# Patient Record
Sex: FEMALE | Race: WHITE | NOT HISPANIC OR LATINO | Employment: FULL TIME | URBAN - METROPOLITAN AREA
[De-identification: names, ages, dates, MRNs, and addresses within clinical notes are randomized per-mention and may not be internally consistent; named-entity substitution may affect disease eponyms.]

---

## 2017-03-07 DIAGNOSIS — H91.92 HEARING LOSS, LEFT: ICD-10-CM

## 2017-03-07 RX ORDER — SPIRONOLACTONE 50 MG/1
50 TABLET, FILM COATED ORAL DAILY
Qty: 30 TABLET | Refills: 3 | Status: SHIPPED | OUTPATIENT
Start: 2017-03-07 | End: 2017-07-05

## 2017-05-17 ENCOUNTER — HOSPITAL ENCOUNTER (OUTPATIENT)
Dept: MAMMOGRAPHY | Facility: CLINIC | Age: 57
Discharge: HOME | End: 2017-05-17
Attending: FAMILY MEDICINE | Admitting: FAMILY MEDICINE
Payer: COMMERCIAL

## 2017-05-17 DIAGNOSIS — Z12.31 VISIT FOR SCREENING MAMMOGRAM: ICD-10-CM

## 2017-05-17 PROCEDURE — G0202 SCR MAMMO BI INCL CAD: HCPCS

## 2017-06-11 DIAGNOSIS — H91.90 HEARING LOSS: Primary | ICD-10-CM

## 2017-06-14 ENCOUNTER — OFFICE VISIT (OUTPATIENT)
Dept: AUDIOLOGY | Facility: CLINIC | Age: 57
End: 2017-06-14

## 2017-06-14 ENCOUNTER — OFFICE VISIT (OUTPATIENT)
Dept: OTOLARYNGOLOGY | Facility: CLINIC | Age: 57
End: 2017-06-14

## 2017-06-14 VITALS — WEIGHT: 131 LBS | BODY MASS INDEX: 23.21 KG/M2 | HEIGHT: 63 IN

## 2017-06-14 DIAGNOSIS — H81.02 MENIERE DISEASE, LEFT: Primary | ICD-10-CM

## 2017-06-14 DIAGNOSIS — H90.5 SENSORINEURAL HEARING LOSS OF LEFT EAR: Primary | ICD-10-CM

## 2017-06-14 DIAGNOSIS — H93.8X2 FULLNESS IN EAR, LEFT: ICD-10-CM

## 2017-06-14 DIAGNOSIS — H91.90 HEARING LOSS: ICD-10-CM

## 2017-06-14 DIAGNOSIS — H93.13 TINNITUS, BILATERAL: ICD-10-CM

## 2017-06-14 RX ORDER — FOLIC ACID 1 MG/1
TABLET ORAL
COMMUNITY
Start: 2016-10-27

## 2017-06-14 RX ORDER — PREDNISONE 5 MG/1
TABLET ORAL
COMMUNITY
Start: 2016-10-27

## 2017-06-14 RX ORDER — IBUPROFEN 200 MG
1 CAPSULE ORAL DAILY
COMMUNITY

## 2017-06-14 ASSESSMENT — PAIN SCALES - GENERAL: PAINLEVEL: NO PAIN (0)

## 2017-06-14 NOTE — NURSING NOTE
Chief Complaint   Patient presents with     RECHECK     follow up     Consult     Syed Acuna LPN

## 2017-06-14 NOTE — PATIENT INSTRUCTIONS
You can see the audiologist for a bicross aid.   Please call our clinic for any questions,concerns,or worsening symptoms.      Clinic #[phone]       Option 3  for the triage nurse.

## 2017-06-14 NOTE — MR AVS SNAPSHOT
After Visit Summary   2017    Leyda Scruggs    MRN: [id number]           Patient Information     Date Of Birth          1960        Visit Information        Provider Department      2017 8:30 AM Vinita Jones AuD [facility] Audiology        Today's Diagnoses     Sensorineural hearing loss of left ear    -  1    Tinnitus, bilateral        Fullness in ear, left           Follow-ups after your visit        Who to contact     Please call your clinic at [phone] to:    Ask questions about your health    Make or cancel appointments    Discuss your medicines    Learn about your test results    Speak to your doctor   If you have compliments or concerns about an experience at your clinic, or if you wish to file a complaint, please contact [facility] Patient Relations at [phone] or email us at [email or web address]         Additional Information About Your Visit        MyChart Information     Xsigo is an electronic gateway that provides easy, online access to your medical records. With Xsigo, you can request a clinic appointment, read your test results, renew a prescription or communicate with your care team.     To sign up for Xsigo visit the website at [email or web address]   You will be asked to enter the access code listed below, as well as some personal information. Please follow the directions to create your username and password.     Your access code is: [email or web address]  Expires: 2017  6:31 AM     Your access code will  in 90 days. If you need help or a new code, please contact your [facility] or call [phone] for assistance.        Care EveryWhere ID     This is your Care EveryWhere ID. This could be used by other organizations to access your [place] medical records  [id number]         Blood Pressure from Last 3 Encounters:   No data found for BP    Weight from Last 3 Encounters:    06/14/17 59.4 kg (131 lb)              We Performed the Following     AUDIOGRAM/TYMPANOGRAM - INTERFACE     [facility] Audiometry Thrshld Eval & Speech Recog (72316)     Tymps / Reflex   (35447)        Primary Care Provider Office Phone # Fax #    Vinnie Hernandez [phone]       [facility] [address] [place] [address]        Thank you!     Thank you for choosing [facility] AUDIOLOGY  for your care. Our goal is always to provide you with excellent care. Hearing back from our patients is one way we can continue to improve our services. Please take a few minutes to complete the written survey that you may receive in the mail after your visit with us. Thank you!             Your Updated Medication List - Protect others around you: Learn how to safely use, store and throw away your medicines at www.disposemymeds.org.          This list is accurate as of: 6/14/17  9:54 AM.  Always use your most recent med list.                   Brand Name Dispense Instructions for use    calcium citrate 950 MG tablet    CALCITRATE     Take 1 tablet by mouth daily       folic acid 1 MG tablet    FOLVITE     TAKE 1 TABLET BY ORAL ROUTE EVERY MORNING       methotrexate 2.5 MG tablet CHEMO      2.5 mg       predniSONE 5 MG tablet    DELTASONE     Current dose 7.5 mg once daily       spironolactone 50 MG tablet    ALDACTONE    30 tablet    Take 1 tablet (50 mg) by mouth daily

## 2017-06-14 NOTE — PROGRESS NOTES
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  In the last year, roughly she has had continued symptoms of rheumatoid arthritis and had to be placed on methotrexate and prednisone.  The patient reports that her dizziness has not been a problem, but her hearing in her left ear continues to deteriorate.  She notes a decline in her word recognition score.  The patient otherwise denies any other changes in her medical condition.        REVIEW OF SYSTEMS/MEDICATIONS:  I did go through her review of systems her medications, she is still on spironolactone.      PHYSICAL EXAMINATION:  Examination today shows her tympanic membranes are intact.  Her tandem gait she had two side steps out of 10 steps.  She was able to do Romberg and finger-to-nose.  She has no nystagmus.  Her facial nerve is a House-Brackmann I.      AUDIOGRAM:  Her audio shows that her word recognition score, which was 50%, has now dropped off to 0%.  The patient has no other new changes.  She brought up the issue of tinnitus in her right ear.  Her audiogram today is about the same.      IMPRESSION:  My overall impression is that she has stable left Meniere's disease which is just gradually continuing to decline in word understanding.      PLAN:  We talked about CROS aids and BAHA.  I have gone over this issue.  I think she is a good candidate for CROS aid.  She tried that before doing anything further.      I talked with her about the fact that the left ear may ultimately require cochlear implant.  The right ear at least for now does not show signs of disease.  However, the appearance of intermittent tinnitus is worrisome, although not diagnostic yet.  I have gone over the issues.  I think that she can follow up in one year.  I have cleared her for a CROS aid.      SL/km

## 2017-06-14 NOTE — MR AVS SNAPSHOT
After Visit Summary   2017    Leyda Scruggs    MRN: [id number]           Patient Information     Date Of Birth          1960        Visit Information        Provider Department      2017 9:45 AM Luis Sylvester MD [facility] Ear Nose and Throat        Care Instructions    You can see the audiologist for a bicross aid.   Please call our clinic for any questions,concerns,or worsening symptoms.      Clinic #[phone]       Option 3  for the triage nurse.          Follow-ups after your visit        Your next 10 appointments already scheduled     2017  8:30 AM CDT   Hearing Aid Consult with Marcell Salas [facility] Audiology ([facility])    [address] [place] [address]  4th [facility] [address] [phone]              Who to contact     Please call your clinic at [phone] to:    Ask questions about your health    Make or cancel appointments    Discuss your medicines    Learn about your test results    Speak to your doctor   If you have compliments or concerns about an experience at your clinic, or if you wish to file a complaint, please contact [facility] Patient Relations at [phone] or email us at [email or web address]         Additional Information About Your Visit        MyChart Information     MeMeMet is an electronic gateway that provides easy, online access to your medical records. With Yuanpei Translation, you can request a clinic appointment, read your test results, renew a prescription or communicate with your care team.     To sign up for MeMeMet visit the website at [email or web address]   You will be asked to enter the access code listed below, as well as some personal information. Please follow the directions to create your username and password.     Your access code is: [email or web address]  Expires: 2017  6:31 AM     Your access code will  in 90 days. If you need help or a new  "code, please contact your [facility] Clinic or call [phone] for assistance.        Care EveryWhere ID     This is your Care EveryWhere ID. This could be used by other organizations to access your [place] medical records  [id number]        Your Vitals Were     Height BMI (Body Mass Index)                1.61 m (5' 3.39\") 22.92 kg/m2           Blood Pressure from Last 3 Encounters:   No data found for BP    Weight from Last 3 Encounters:   06/14/17 59.4 kg (131 lb)              Today, you had the following     No orders found for display       Primary Care Provider Office Phone # Fax #    Vinnie Hernandez [phone]       [facility] [address] [place] [address]        Thank you!     Thank you for choosing [facility] EAR NOSE AND THROAT  for your care. Our goal is always to provide you with excellent care. Hearing back from our patients is one way we can continue to improve our services. Please take a few minutes to complete the written survey that you may receive in the mail after your visit with us. Thank you!             Your Updated Medication List - Protect others around you: Learn how to safely use, store and throw away your medicines at www.disposemymeds.org.          This list is accurate as of: 6/14/17 10:14 AM.  Always use your most recent med list.                   Brand Name Dispense Instructions for use    calcium citrate 950 MG tablet    CALCITRATE     Take 1 tablet by mouth daily       folic acid 1 MG tablet    FOLVITE     TAKE 1 TABLET BY ORAL ROUTE EVERY MORNING       methotrexate 2.5 MG tablet CHEMO      2.5 mg       predniSONE 5 MG tablet    DELTASONE     Current dose 7.5 mg once daily       spironolactone 50 MG tablet    ALDACTONE    30 tablet    Take 1 tablet (50 mg) by mouth daily         "

## 2017-06-14 NOTE — PROGRESS NOTES
AUDIOLOGY REPORT    SUMMARY: Audiology visit completed. See audiogram for results.      RECOMMENDATIONS: Follow-up with ENT.      Marcell Salas  Audiologist  [place] License  #[id number]

## 2017-06-14 NOTE — LETTER
Hearing Aid Medical Clearance    Leyda Scruggs  June 14, 2017        This patient has received a medical examination and may be considered a suitable candidate for a hearing aid.   Bicross aid.      Physician:__________________________________________________

## 2017-07-05 DIAGNOSIS — H81.09 MENIERE'S DISEASE: Primary | ICD-10-CM

## 2017-07-05 RX ORDER — SPIRONOLACTONE 50 MG/1
50 TABLET, FILM COATED ORAL DAILY
Qty: 30 TABLET | Refills: 11 | Status: SHIPPED | OUTPATIENT
Start: 2017-07-05 | End: 2018-06-22

## 2017-07-24 ENCOUNTER — OFFICE VISIT (OUTPATIENT)
Dept: AUDIOLOGY | Facility: CLINIC | Age: 57
End: 2017-07-24

## 2017-07-24 DIAGNOSIS — H90.42 SENSORINEURAL HEARING LOSS (SNHL) OF LEFT EAR WITH UNRESTRICTED HEARING OF RIGHT EAR: Primary | ICD-10-CM

## 2017-07-24 NOTE — PROGRESS NOTES
AUDIOLOGY REPORT    SUBJECTIVE: Leyda Scruggs is a 56 year old female was seen in the Audiology Clinic at  [facility] on 7/24/17 to discuss concerns with hearing and functional communication difficulties.  Leyda has been seen previously on 6/14/2017, and results revealed a left sensorineural hearing loss with poor word recognition score and normal hearing in the right ear.  The patient was medically evaluated and determined to be cleared for a CROS device by Dr. Shon Sylvester. Leyda notes difficulty with communication in a variety of listening situations, mostly when someone is seated on her left side.  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    OBJECTIVE:  Patient is a hearing aid candidate. The patient was presented with different options for amplification to help aid in communication (CROS vs traditional). Discussed styles, levels of technology and monaural vs. binaural fitting.     Patient was able to tral a demo Widex CROS device in the clinic today and realistic expectations were reviewed. She decided that she would like to move forward and pursue CROS device.    The hearing aids mutually chosen were:  Right: Phonak Audeo B50  Left: Phonak CROS B transmitter  COLOR: P3  BATTERY SIZE: 13  EARMOLD/TIPS: Open domes  CANAL/ LENGTH: 1 standard      ASSESSMENT:     Reviewed purchase information and warranty information with patient. The 45 day trial period was explained to patient. The patient was given a copy of the [place] Department of Health consumer brochure on purchasing hearing instruments. Patient risk factors have been provided to the patient in writing prior to the sale of the hearing aid per FDA regulation. The risk factors are also available in the User Instructional Booklet to be presented on the day of the hearing aid fitting. Hearing aid(s) ordered. Hearing aid evaluation completed.    PLAN: Leyda is scheduled to  return in 2-3 weeks for a hearing aid fitting and programming. Purchase agreement will be completed on that date. Please contact this clinic with any questions or concerns.      Marcell Salas  Audiologist  [place] License  #[id number]

## 2017-07-24 NOTE — MR AVS SNAPSHOT
After Visit Summary   2017    Leyda Scruggs    MRN: [id number]           Patient Information     Date Of Birth          1960        Visit Information        Provider Department      2017 8:30 AM Vinita Jones AuD [facility] Audiology         Follow-ups after your visit        Your next 10 appointments already scheduled     Aug 21, 2017 10:00 AM CDT   Hearing Aid Fitting with Marcell Salas [facility] Audiology ([facility])    [address] [place] [address] [phone]            Sep 07, 2017  8:30 AM CDT   (Arrive by 8:15 AM)   Initial Review Program with Marcell Salas [facility] Audiology ([facility])    [address] [place] [address] [phone]              Who to contact     Please call your clinic at [phone] to:    Ask questions about your health    Make or cancel appointments    Discuss your medicines    Learn about your test results    Speak to your doctor   If you have compliments or concerns about an experience at your clinic, or if you wish to file a complaint, please contact [facility] Patient Relations at [phone] or email us at [email or web address]         Additional Information About Your Visit        MyChart Information     Auterrat is an electronic gateway that provides easy, online access to your medical records. With Vamosa, you can request a clinic appointment, read your test results, renew a prescription or communicate with your care team.     To sign up for Auterrat visit the website at [email or web address]   You will be asked to enter the access code listed below, as well as some personal information. Please follow the directions to create your username and password.     Your access code is: [email or web address]  Expires: 2017  6:31 AM     Your access code will  in 90  days. If you need help or a new code, please contact your [facility] Clinic or call [phone] for assistance.        Care EveryWhere ID     This is your Care EveryWhere ID. This could be used by other organizations to access your [place] medical records  [id number]         Blood Pressure from Last 3 Encounters:   No data found for BP    Weight from Last 3 Encounters:   06/14/17 59.4 kg (131 lb)              Today, you had the following     No orders found for display       Primary Care Provider Office Phone # Fax #    Vinnie Hernandez [phone]       [facility] [address]  [place] [address]        Equal Access to Services     [facility]: Hadii jerzy bone hadgaetanoo Soamauri, waaxda luqadaha, qaybta kaalmada aderitayada, carlos blackmon . So [facility] [phone].    ATENCIÓN: Si habla español, tiene a tom disposición servicios gratuitos de asistencia lingüística. Llame al [phone].    We comply with applicable federal civil rights laws and [place] laws. We do not discriminate on the basis of race, color, national origin, age, disability sex, sexual orientation or gender identity.            Thank you!     Thank you for choosing [facility] AUDIOLOGY  for your care. Our goal is always to provide you with excellent care. Hearing back from our patients is one way we can continue to improve our services. Please take a few minutes to complete the written survey that you may receive in the mail after your visit with us. Thank you!             Your Updated Medication List - Protect others around you: Learn how to safely use, store and throw away your medicines at www.disposemymeds.org.          This list is accurate as of: 7/24/17  9:35 AM.  Always use your most recent med list.                   Brand Name Dispense Instructions for use Diagnosis    calcium citrate 950 MG tablet    CALCITRATE     Take 1 tablet by mouth daily        folic acid 1 MG  tablet    FOLVITE     TAKE 1 TABLET BY ORAL ROUTE EVERY MORNING        methotrexate 2.5 MG tablet CHEMO      2.5 mg        predniSONE 5 MG tablet    DELTASONE     Current dose 7.5 mg once daily        spironolactone 50 MG tablet    ALDACTONE    30 tablet    Take 1 tablet (50 mg) by mouth daily    Meniere's disease

## 2017-08-21 ENCOUNTER — OFFICE VISIT (OUTPATIENT)
Dept: AUDIOLOGY | Facility: CLINIC | Age: 57
End: 2017-08-21

## 2017-08-21 DIAGNOSIS — H90.42 SENSORINEURAL HEARING LOSS (SNHL) OF LEFT EAR WITH UNRESTRICTED HEARING OF RIGHT EAR: Primary | ICD-10-CM

## 2017-08-21 NOTE — MR AVS SNAPSHOT
After Visit Summary   2017    Leyda Scruggs    MRN: [id number]           Patient Information     Date Of Birth          1960        Visit Information        Provider Department      2017 10:05 AM Vinita Jones AuD [facility] Audiology        Today's Diagnoses     Sensorineural hearing loss (SNHL) of left ear with unrestricted hearing of right ear    -  1       Follow-ups after your visit        Your next 10 appointments already scheduled     Sep 07, 2017  8:30 AM CDT   (Arrive by 8:15 AM)   Initial Review Program with Marcell Salas [facility] Audiology ([facility] [place])    [address] [place] [address] [phone]              Who to contact     Please call your clinic at [phone] to:    Ask questions about your health    Make or cancel appointments    Discuss your medicines    Learn about your test results    Speak to your doctor   If you have compliments or concerns about an experience at your clinic, or if you wish to file a complaint, please contact [facility] Patient Relations at [phone] or email us at [email or web address]         Additional Information About Your Visit        MyChart Information     Gruppo MutuiOnlinehart is an electronic gateway that provides easy, online access to your medical records. With Zumeo.com, you can request a clinic appointment, read your test results, renew a prescription or communicate with your care team.     To sign up for eFuneralt visit the website at [email or web address][email or web address]   You will be asked to enter the access code listed below, as well as some personal information. Please follow the directions to create your username and password.     Your access code is: [email or web address]  Expires: 2017  6:31 AM     Your access code will  in 90 days. If you need help or a new code, please contact your [facility] or  call [phone] for assistance.        Care EveryWhere ID     This is your Care EveryWhere ID. This could be used by other organizations to access your [place] medical records  [id number]         Blood Pressure from Last 3 Encounters:   No data found for BP    Weight from Last 3 Encounters:   06/14/17 59.4 kg (131 lb)              We Performed the Following     Hearing Aid Fitting        Primary Care Provider Office Phone # Fax #    Vinnie Hernandez [phone]       [facility] [address] [place] [address]        Equal Access to Services     [facility]: Hadii aad ku hadasho Soomaali, waaxda luqadaha, qaybta kaalmada adeegyada, carlos blackmon . So [facility] [phone].    ATENCIÓN: Si habla español, tiene a tom disposición servicios gratuitos de asistencia lingüística. [facility] [phone].    We comply with applicable federal civil rights laws and [place] laws. We do not discriminate on the basis of race, color, national origin, age, disability sex, sexual orientation or gender identity.            Thank you!     Thank you for choosing [facility] AUDIOLOGY  for your care. Our goal is always to provide you with excellent care. Hearing back from our patients is one way we can continue to improve our services. Please take a few minutes to complete the written survey that you may receive in the mail after your visit with us. Thank you!             Your Updated Medication List - Protect others around you: Learn how to safely use, store and throw away your medicines at www.disposemymeds.org.          This list is accurate as of: 8/21/17  7:23 PM.  Always use your most recent med list.                   Brand Name Dispense Instructions for use Diagnosis    calcium citrate 950 MG tablet    CALCITRATE     Take 1 tablet by mouth daily        folic acid 1 MG tablet    FOLVITE     TAKE 1 TABLET BY ORAL ROUTE EVERY MORNING        methotrexate 2.5 MG tablet CHEMO      2.5  mg        predniSONE 5 MG tablet    DELTASONE     Current dose 7.5 mg once daily        spironolactone 50 MG tablet    ALDACTONE    30 tablet    Take 1 tablet (50 mg) by mouth daily    Meniere's disease

## 2017-08-21 NOTE — PROGRESS NOTES
"AUDIOLOGY REPORT    SUBJECTIVE: Leyda Scruggs is a 56 year old female who was seen in the Audiology Clinic at the [facility] for a fitting of a right Phonak Audeo B50 hearing aid and left Phonak CROS B transmitter. Previous results have revealed a unilateral sensorineural hearing loss with poor word recognition in the left ear. The patient was given medical clearance to pursue amplification by Luis Sylvester MD.     OBJECTIVE: The hearing aid conformity evaluation was completed.The hearing aids were placed and they provided a good fit. Real-ear-probe-microphone measurements were completed on the Poppin system and were a good match to NAL-NL1 target with soft sounds audible, moderate sounds comfortable, and loud sounds below discomfort. UCLs are verified through maximum power output measures and demonstrate appropriate limiting of loud inputs. Leyda was oriented to proper hearing aid use, care, cleaning (no water, dry brush), batteries (size 13, insertion/removal, toxicity, low-battery signal), aid insertion/removal, user booklet, warranty information, storage cases, and other hearing aid details. The patient confirmed understanding of hearing aid use and care, and showed proper insertion of hearing aid and batteries while in the office today.Leyda reported good volume and sound quality today. Of note, she did report a \"static\"/ \"crackling\" sound on in the right ear, it was discussed that because of her good hearing in the right ear she is able to hear the circuit noise coming from the hearing aid.   Hearing aids were programmed as follows:  Program 1:AutoSense  Program button activated for Left Microphone Attenuation  Volume control activated (Flex control right an CROS balance left).    EAR(S) FIT: Binaural  HEARING AID MODEL NAME:  Right: Phonak Audeo B50,  Left: Phonak CROS B Transmitter  EARMOLDS/TIP/ LINK: #2 bilaterally, open medium left, open large right  SERIAL NUMBERS: " Right: [id number]; Left: [id number]  WARRANTY END DATE: 10/21/19 Right,  10/21/2018 Left    ASSESSMENT: A right hearing aid and left CROS transmitter were fit today. Verification measures were performed. Leyda signed the Hearing Aid Purchase Agreement and was given a copy, as well as details on her hearing aids.    PLAN:Leyda will return for follow-up in 2-3 weeks for a hearing aid review appointment. Please call this clinic with questions regarding today s appointment.    Marcell Salas  Audiologist  [place] License  #[id number]

## 2017-08-23 ENCOUNTER — TELEPHONE (OUTPATIENT)
Dept: AUDIOLOGY | Facility: CLINIC | Age: 57
End: 2017-08-23

## 2017-08-23 NOTE — TELEPHONE ENCOUNTER
Email received from patient:      From: Leyda Scruggs [[email or web address]   Sent: Tuesday, August 22, 2017 5:43 PM  To: Vinita Watts  Subject: questions about my Phonak CROS B hearing aid I received on Monday    Vinita,    I have been wearing my CROS B hearing aid the last couple of days.  I noticed that I don t seem to be able to comprehend words in my left ear (the one in which I lost hearing) when I use the telephone and when I wear a head set to listen to a meeting remotely.  I have figured out how to mute the transmitter, which is nice while I m high way driving or in the [place], so I know that the hearing aid wasn t muted when I was on the phone.      Did I misunderstand and the hearing aid should just improve general sounds or should I be able to hear and understand words with my hearing aids in place?  Am I expecting too much to be able to use a telephone and hear using my left ear?      Regards,  Leyda Scruggs      Response:     God clinton Crabtree,    The way this system works is that it is only putting sound into the right ear, we are completely bypassing the left ear.  The transmitter does not put any sound into the left ear, it s only function is to transmit sounds from the left side to the right side.    Because we are not amplifying sound in the left ear, I would expect your left ear to function exactly the same as it did prior to wearing the CROS device.  Again, the purpose of the device is to give you access to sounds on your left side by allowing you to hear the left side sounds in your right ear.  I would recommend to continuing to use the right ear with the phone.    Does that help clarify things?  Let me know if you have additional questions,    Regards    Amadeo Salas  Audiologist

## 2017-09-07 ENCOUNTER — OFFICE VISIT (OUTPATIENT)
Dept: AUDIOLOGY | Facility: CLINIC | Age: 57
End: 2017-09-07

## 2017-09-07 DIAGNOSIS — H90.42 SENSORINEURAL HEARING LOSS (SNHL) OF LEFT EAR WITH UNRESTRICTED HEARING OF RIGHT EAR: Primary | ICD-10-CM

## 2017-09-07 NOTE — PROGRESS NOTES
"AUDIOLOGY REPORT    BACKGROUND INFORMATION: Leyda Scruggs was seen in the Audiology Clinic at the [facility] [place] on 9/7/2017 for follow-up.  The patient has been seen previously in this clinic on 6/14/17 and results revealed normal hearing in the right ear and a mild sloping to severe sensorineural hearing loss in the left ear with poor word recognition scores in the left ear.  The patient was fit with a right Phonak Audeo B50 hearing aid and left CROS B transmitter on 8/21/17.  The patient reports discomfort from he retention pieces and that sounds on the left ear are garbled.  She also struggles when using the phone on the transmitter side.  She is unsure if she will be keeping the devices.    TEST RESULTS AND PROCEDURES: A first follow-up was performed. Otoscopy revealed clear ear canals with no sores or irritations noted. The retention tails were modified and made shorter as she felt that they were too long.  Adjustments were made including increasing the \"CROS balance\" to be stronger on the CROS side as well as turning down the high frequency gain 3 dB. The proper positioning of the telephone was practiced, Leyda had been holding the phone too low on the ear instead of up by the microphones.  She reported that she was excited to try and use the phone on the CROS side at work.     Patient was counseled that she would still be hearing sounds in the left ear from her environment because she still has hearing thresholds and the CROS side does not completely eliminate her ability to hear in the left ear.  She expressed understanding.    SUMMARY AND RECOMMENDATIONS: A first follow-up was performed today and the patient reports that she is unsure if she would like to keep the devices.  Adjustments were made as noted above and the patient will return for follow-up on 10/5/17.  Call this clinic with questions regarding today s visit.      Vinita Jones, " AuD  Audiologist  [place] License  #[id number]

## 2017-09-07 NOTE — MR AVS SNAPSHOT
After Visit Summary   2017    Leyda Scruggs    MRN: [id number]           Patient Information     Date Of Birth          1960        Visit Information        Provider Department      2017 8:30 AM Vinita Jones AuD M [facility] Audiology        Today's Diagnoses     Sensorineural hearing loss (SNHL) of left ear with unrestricted hearing of right ear    -  1       Follow-ups after your visit        Who to contact     Please call your clinic at [phone] to:    Ask questions about your health    Make or cancel appointments    Discuss your medicines    Learn about your test results    Speak to your doctor   If you have compliments or concerns about an experience at your clinic, or if you wish to file a complaint, please contact [facility] Patient Relations at [phone] or email us at [email or web address]         Additional Information About Your Visit        MyChart Information     Widespacet is an electronic gateway that provides easy, online access to your medical records. With [employer], you can request a clinic appointment, read your test results, renew a prescription or communicate with your care team.     To sign up for Widespacet visit the website at [email or web address]   You will be asked to enter the access code listed below, as well as some personal information. Please follow the directions to create your username and password.     Your access code is: [email or web address]  Expires: 2017 11:53 AM     Your access code will  in 90 days. If you need help or a new code, please contact your [facility] Clinic or call [phone] for assistance.        Care EveryWhere ID     This is your Care EveryWhere ID. This could be used by other organizations to access your [place] medical records  [id number]         Blood Pressure from Last 3 Encounters:   No data found for BP    Weight from Last 3 Encounters:   17  59.4 kg (131 lb)              We Performed the Following     No Charge, Hearing Aid Clinic Visit        Primary Care Provider Office Phone # Fax #    Vinnie Hernandez [phone]       [facility] [address] [place] [address]        Equal Access to Services     [facility]: Hadii aad ku hadasho Soomaali, waaxda luqadaha, qaybta kaalmada adeegyada, waxay idiin hayaan aderita ortiz laMaheshfariban . So [facility] [phone].    ATENCIÓN: Si habla español, tiene a tom disposición servicios gratuitos de asistencia lingüística. Betzaidaame al [phone].    We comply with applicable federal civil rights laws and [place] laws. We do not discriminate on the basis of race, color, national origin, age, disability sex, sexual orientation or gender identity.            Thank you!     Thank you for choosing [facility] AUDIOLOGY  for your care. Our goal is always to provide you with excellent care. Hearing back from our patients is one way we can continue to improve our services. Please take a few minutes to complete the written survey that you may receive in the mail after your visit with us. Thank you!             Your Updated Medication List - Protect others around you: Learn how to safely use, store and throw away your medicines at www.disposemymeds.org.          This list is accurate as of: 9/7/17 11:53 AM.  Always use your most recent med list.                   Brand Name Dispense Instructions for use Diagnosis    calcium citrate 950 MG tablet    CALCITRATE     Take 1 tablet by mouth daily        folic acid 1 MG tablet    FOLVITE     TAKE 1 TABLET BY ORAL ROUTE EVERY MORNING        methotrexate 2.5 MG tablet CHEMO      2.5 mg        predniSONE 5 MG tablet    DELTASONE     Current dose 7.5 mg once daily        spironolactone 50 MG tablet    ALDACTONE    30 tablet    Take 1 tablet (50 mg) by mouth daily    Meniere's disease

## 2017-10-05 ENCOUNTER — OFFICE VISIT (OUTPATIENT)
Dept: AUDIOLOGY | Facility: CLINIC | Age: 57
End: 2017-10-05

## 2017-10-05 DIAGNOSIS — H90.42 SENSORINEURAL HEARING LOSS (SNHL) OF LEFT EAR WITH UNRESTRICTED HEARING OF RIGHT EAR: Primary | ICD-10-CM

## 2017-10-05 NOTE — PROGRESS NOTES
AUDIOLOGY REPORT    BACKGROUND INFORMATION: Leyda Scruggs was seen in the Audiology Clinic at the [facility] on 10/5/2017 for follow-up.  The patient has been seen previously in this clinic on 6/14/2017 and results revealed normal hearing in the right ear and a mild sloping to severe sensorineural hearing loss in the left ear with poor word recognition scores in the left ear.  The patient was fit with a right Phonak Audeo B50 hearing aid and left CROS B transmitter on 8/21/2017.  The patient reports that the sound quality and perceived benefit has improved since the initial follow-up.  She reports that changing the CROS balance at the previous appointment has helped with sound awareness.  She reports that she is noticing benefit with the device in meetings and noted that one co-worker has mentioned that Leyda does not appear to startle when someone approaches from the left side.    TEST RESULTS AND PROCEDURES: A second follow-up was performed.  No adjustments were made as patient is happy with sound quality.  A review of the different features (CROS balance control on left device and volume on right device) was performed.  A review of cleaning, device care, and maintenance was also performed.  She was given spare domes to use to change out if needed.    She had questions regarding wear time of the devices.  It was reviewed that if she is in an environment where she feels she is not receiving benefit from having the CROS side activated, she may either use the microphone attenuation feature or remove the device.      SUMMARY AND RECOMMENDATIONS: A second follow-up was performed today and the patient reports that she has noticed more benefit since the first IRP and would like to keep the devices.  The patient will return as needed or at least every 4-6 months for cleaning and assessment of hearing aid.  Call this clinic with questions regarding today s visit.      Vinita  Marclel Jones  Audiologist  [place] License  #[id number]

## 2017-10-05 NOTE — MR AVS SNAPSHOT
After Visit Summary   10/5/2017    Leyda Scruggs    MRN: [id number]           Patient Information     Date Of Birth          1960        Visit Information        Provider Department      10/5/2017 10:00 AM Vinita Jones AuD M [facility] Audiology        Today's Diagnoses     Sensorineural hearing loss (SNHL) of left ear with unrestricted hearing of right ear    -  1       Follow-ups after your visit        Who to contact     Please call your clinic at [phone] to:    Ask questions about your health    Make or cancel appointments    Discuss your medicines    Learn about your test results    Speak to your doctor   If you have compliments or concerns about an experience at your clinic, or if you wish to file a complaint, please contact [facility] Patient Relations at [phone] or email us at [email or web address]         Additional Information About Your Visit        MyChart Information     J[employer]t is an electronic gateway that provides easy, online access to your medical records. With [employer], you can request a clinic appointment, read your test results, renew a prescription or communicate with your care team.     To sign up for Jibe Mobilet visit the website at [email or web address]   You will be asked to enter the access code listed below, as well as some personal information. Please follow the directions to create your username and password.     Your access code is: [email or web address]  Expires: 2017 11:53 AM     Your access code will  in 90 days. If you need help or a new code, please contact your [facility] or call [phone] for assistance.        Care EveryWhere ID     This is your Care EveryWhere ID. This could be used by other organizations to access your [place] medical records  [id number]         Blood Pressure from Last 3 Encounters:   No data found for BP    Weight from Last 3 Encounters:    06/14/17 59.4 kg (131 lb)              We Performed the Following     No Charge, Hearing Aid Clinic Visit        Primary Care Provider Office Phone # Fax #    Vinnie Hernandez [phone]       [facility] [address] [place] [address]        Equal Access to Services     [facility] : Hadii aad ku hadasho Soomaali, waaxda luqadaha, qaybta kaalmada adeegyada, waxay elizabethin hayfariban aderita ortiz lalissettn . So [facility] [phone].    ATENCIÓN: Si habla español, tiene a tom disposición servicios gratuitos de asistencia lingüística. [facility] [phone].    We comply with applicable federal civil rights laws and [place] laws. We do not discriminate on the basis of race, color, national origin, age, disability, sex, sexual orientation, or gender identity.            Thank you!     Thank you for choosing [facility] AUDIOLOGY  for your care. Our goal is always to provide you with excellent care. Hearing back from our patients is one way we can continue to improve our services. Please take a few minutes to complete the written survey that you may receive in the mail after your visit with us. Thank you!             Your Updated Medication List - Protect others around you: Learn how to safely use, store and throw away your medicines at www.disposemymeds.org.          This list is accurate as of: 10/5/17  1:12 PM.  Always use your most recent med list.                   Brand Name Dispense Instructions for use Diagnosis    calcium citrate 950 MG tablet    CALCITRATE     Take 1 tablet by mouth daily        folic acid 1 MG tablet    FOLVITE     TAKE 1 TABLET BY ORAL ROUTE EVERY MORNING        methotrexate 2.5 MG tablet CHEMO      2.5 mg        predniSONE 5 MG tablet    DELTASONE     Current dose 7.5 mg once daily        spironolactone 50 MG tablet    ALDACTONE    30 tablet    Take 1 tablet (50 mg) by mouth daily    Meniere's disease

## 2018-03-02 DIAGNOSIS — H90.5 SNHL (SENSORINEURAL HEARING LOSS): Primary | ICD-10-CM

## 2018-06-21 DIAGNOSIS — H81.09 MENIERE'S DISEASE: Primary | ICD-10-CM

## 2018-06-22 ENCOUNTER — OFFICE VISIT (OUTPATIENT)
Dept: AUDIOLOGY | Facility: CLINIC | Age: 58
End: 2018-06-22
Payer: COMMERCIAL

## 2018-06-22 ENCOUNTER — OFFICE VISIT (OUTPATIENT)
Dept: OTOLARYNGOLOGY | Facility: CLINIC | Age: 58
End: 2018-06-22
Payer: COMMERCIAL

## 2018-06-22 VITALS — BODY MASS INDEX: 23.21 KG/M2 | WEIGHT: 131 LBS | HEIGHT: 63 IN

## 2018-06-22 DIAGNOSIS — H92.03 OTALGIA OF BOTH EARS: ICD-10-CM

## 2018-06-22 DIAGNOSIS — H90.42 SENSORINEURAL HEARING LOSS (SNHL) OF LEFT EAR WITH UNRESTRICTED HEARING OF RIGHT EAR: Primary | ICD-10-CM

## 2018-06-22 DIAGNOSIS — H81.09 MENIERE'S DISEASE, UNSPECIFIED LATERALITY: ICD-10-CM

## 2018-06-22 DIAGNOSIS — H93.11 TINNITUS OF RIGHT EAR: ICD-10-CM

## 2018-06-22 DIAGNOSIS — H81.09 MENIERE'S DISEASE: ICD-10-CM

## 2018-06-22 DIAGNOSIS — H91.8X2 OTHER HEARING LOSS OF LEFT EAR WITH RESTRICTED HEARING OF RIGHT EAR: ICD-10-CM

## 2018-06-22 DIAGNOSIS — H93.12 TINNITUS, LEFT: Primary | ICD-10-CM

## 2018-06-22 RX ORDER — HYDROXYCHLOROQUINE SULFATE 200 MG/1
400 TABLET, FILM COATED ORAL DAILY
COMMUNITY

## 2018-06-22 ASSESSMENT — PAIN SCALES - GENERAL: PAINLEVEL: MODERATE PAIN (5)

## 2018-06-22 NOTE — MR AVS SNAPSHOT
After Visit Summary   6/22/2018    Leyda Scruggs    MRN: [id number]           Patient Information     Date Of Birth          1960        Visit Information        Provider Department      6/22/2018 10:45 AM Luis Sylvseter MD [facility] Ear Nose and Throat        Today's Diagnoses     Tinnitus, left    -  1    Meniere's disease        Hearing loss          Care Instructions    You will have an MRI done, you will be called with the results.  Stop the spironolactone.   Please call our clinic for any questions,concerns,or worsening symptoms.      Clinic #[phone]       Option 3  for the triage nurse.          Follow-ups after your visit        Your next 10 appointments already scheduled     Jun 24, 2018  7:30 AM CDT   MR BRAIN W/O & W CONTRAST with SHMRP1   [facility])    [address] [place] [id number] [phone]           Take your medicines as usual, unless your doctor tells you not to. Bring a list of your current medicines to your exam (including vitamins, minerals and over-the-counter drugs).  You may or may not receive intravenous (IV) contrast for this exam pending the discretion of the Radiologist.  You do not need to do anything special to prepare.  The MRI machine uses a strong magnet. Please wear clothes without metal (snaps, zippers). A sweatsuit works well, or we may give you a hospital gown.  Please remove any body piercings and hair extensions before you arrive. You will also remove watches, jewelry, hairpins, wallets, dentures, partial dental plates and hearing aids. You may wear contact lenses, and you may be able to wear your rings. We have a safe place to keep your personal items, but it is safer to leave them at home.  **IMPORTANT** THE INSTRUCTIONS BELOW ARE ONLY FOR THOSE PATIENTS WHO HAVE BEEN PRESCRIBED SEDATION OR GENERAL ANESTHESIA DURING THEIR MRI PROCEDURE:  IF YOUR DOCTOR PRESCRIBED ORAL SEDATION (take  medicine to help you relax during your exam):   You must get the medicine from your doctor (oral medication) before you arrive. Bring the medicine to the exam. Do not take it at home. You ll be told when to take it upon arriving for your exam.   Arrive one hour early. Bring someone who can take you home after the test. Your medicine will make you sleepy. After the exam, you may not drive, take a bus or take a taxi by yourself.  IF YOUR DOCTOR PRESCRIBED IV SEDATION:   Arrive one hour early. Bring someone who can take you home after the test. Your medicine will make you sleepy. After the exam, you may not drive, take a bus or take a taxi by yourself.   No eating 6 hours before your exam. You may have clear liquids up until 4 hours before your exam. (Clear liquids include water, clear tea, black coffee and fruit juice without pulp.)  IF YOUR DOCTOR PRESCRIBED ANESTHESIA (be asleep for your exam):   Arrive 1 1/2 hours early. Bring someone who can take you home after the test. You may not drive, take a bus or take a taxi by yourself.   No eating 8 hours before your exam. You may have clear liquids up until 4 hours before your exam. (Clear liquids include water, clear tea, black coffee and fruit juice without pulp.)   You will spend four to five hours in the recovery room.  Please call the Imaging Department at your exam site with any questions.              Future tests that were ordered for you today     Open Future Orders        Priority Expected Expires Ordered    MRI Temporal Bone/IAC With and W/O Contrast Routine  6/22/2019 6/22/2018    AUDIOLOGY ADULT REFERRAL Routine  12/18/2018 6/21/2018            Who to contact     Please call your clinic at [phone] to:    Ask questions about your health    Make or cancel appointments    Discuss your medicines    Learn about your test results    Speak to your doctor            Additional Information About Your Visit        VMIX Media[employer] Information     ironSource is an electronic  "gateway that provides easy, online access to your medical records. With Match[employer]arm, you can request a clinic appointment, read your test results, renew a prescription or communicate with your care team.     To sign up for Matchalarm visit the website at [email or web address]   You will be asked to enter the access code listed below, as well as some personal information. Please follow the directions to create your username and password.     Your access code is: [email or web address]  Expires: 2018  6:30 AM     Your access code will  in 90 days. If you need help or a new code, please contact your [facility] Clinic or call [phone] for assistance.        Care EveryWhere ID     This is your Care EveryWhere ID. This could be used by other organizations to access your [place] medical records  [id number]        Your Vitals Were     Height BMI (Body Mass Index)                1.61 m (5' 3.39\") 22.92 kg/m2           Blood Pressure from Last 3 Encounters:   No data found for BP    Weight from Last 3 Encounters:   18 59.4 kg (131 lb)   17 59.4 kg (131 lb)               Primary Care Provider Office Phone # Fax #    Vinnie Hernandez [phone]       [facility] [address] [facility] [address]        Equal Access to Services     [facility]: Hadii aad ku hadasho Soomaali, waaxda luqadaha, qaybta kaalmada adeegyada, carlos blackmon . So [facility] [phone].    ATENCIÓN: Si habla español, tiene a tom disposición servicios gratuitos de asistencia lingüística. [place] al [phone].    We comply with applicable federal civil rights laws and [place] laws. We do not discriminate on the basis of race, color, national origin, age, disability, sex, sexual orientation, or gender identity.            Thank you!     Thank you for choosing [facility] EAR NOSE AND THROAT  for your care. Our goal is always to provide you with excellent care. Hearing back " from our patients is one way we can continue to improve our services. Please take a few minutes to complete the written survey that you may receive in the mail after your visit with us. Thank you!             Your Updated Medication List - Protect others around you: Learn how to safely use, store and throw away your medicines at www.disposemymeds.org.          This list is accurate as of 6/22/18 12:02 PM.  Always use your most recent med list.                   Brand Name Dispense Instructions for use Diagnosis    calcium citrate 950 MG tablet    CALCITRATE     Take 1 tablet by mouth daily        folic acid 1 MG tablet    FOLVITE     TAKE 1 TABLET BY ORAL ROUTE EVERY MORNING        hydroxychloroquine 200 MG tablet    PLAQUENIL     Take 400 mg by mouth daily        methotrexate 2.5 MG tablet CHEMO      2.5 mg        predniSONE 5 MG tablet    DELTASONE     Current dose 7.5 mg once daily        spironolactone 50 MG tablet    ALDACTONE    30 tablet    Take 1 tablet (50 mg) by mouth daily    Meniere's disease

## 2018-06-22 NOTE — LETTER
6/22/2018       RE: Leyda Scruggs  [address]  [place] [address]     Dear Colleague,    Thank you for referring your patient, Leyda Scruggs, to the [facility] EAR NOSE AND THROAT at [facility]. Please see a copy of my visit note below.    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 She reports that the tinnitus is if anything a little bit worse and that she is really bothered by it. She is unable to hear and reports that now her right ear also bothers her. She has some increase in tinnitus on that side as well.  She denies any symptoms of dizziness or vertigo.  In terms of her general health she is been doing well.    Physical Exam: Exam today shows that her as minimal cerumen on the left side. I removed this with an alligator forceps. The remainder of the exam is healthy. Her facial nerve is normal with House Brackmann class I bilaterally. Examination of the eye movements does not reveal obvious nystagmus.    Imaging: I went back over her imaging from 2015 and found that the imaging has an area just at the lateral end of the left canal which is questionable for enhancement.    Audiogram: Today's audiogram indicates that there is a progression of the hearing loss in left ear. This is not so apparent in her pure tone average, but in word recognition score she has declined substantially. The right better hearing ear has no change in the pure-tone average. A note though that some frequencies have changed about 10 dB in the higher tones.    Impression/Plan: In spite of treatment with diuretics, she is not really improved. This is an unfortunate finding. I cannot easily account for it. The diuretic does not seem to be helping her and therefore I will discontinue it. Because of the finding in 2015 and the progression of her word recognition score, I will reorder an MRI scan. She should  continue annual follow-ups.      SL/ms      Again, thank you for allowing me to participate in the care of your patient.      Sincerely,    Luis Sylvester MD

## 2018-06-22 NOTE — PROGRESS NOTES
AUDIOLOGY REPORT    SUBJECTIVE:  Leyda Scruggs is a 57 year old female who was seen in the Audiology Clinic at the [facility] and [facility] for audiologic evaluation, referred by Luis Sylvester MD.  The patient has been seen previously in this clinic on 6/14/17 for assessment and results indicated unilateral left sensorineural hearing loss.  She wears a Phonak CROS device due to poor word recognition scores in the left ear. The patient reports a sharp bilateral otalgia which is intermittent but has prevented her from wearing the devices regularly as it is uncomfortable. She reports the otalgia both with and without the devices. She reports bilateral aural fullness which is worse in the left ear.  Leyda also reports that the tinnitus in her right ear has worsened. The patient denies bilateral drainage and dizziness.  The patient notes difficulty with communication in a variety of listening situations.     OBJECTIVE:  Fall Risk Screen:  1. Have you fallen two or more times in the past year? No  2. Have you fallen and had an injury in the past year? No    Timed Up and Go Score (in seconds): not tested  Is patient a fall risk? No  Referral initiated: No  Fall Risk Assessment Completed by Audiology    Otoscopic exam indicates ears are clear of cerumen bilaterally .    Pure Tone Thresholds assessed using conventional audiometry with good  reliability from 250-8000 Hz bilaterally using insert earphones and circumaural headphones     RIGHT:  Normal hearing    LEFT:    Mild to severe essentially sensorineural hearing loss with 15 dB air-bone gaps at 250 Hz and 1 kHz.    Tympanogram:    RIGHT: normal eardrum mobility    LEFT:   normal eardrum mobility    Reflexes (reported by stimulus ear):  RIGHT: Ipsilateral is present at normal levels  RIGHT: Contralateral is present at normal levels  LEFT:   Ipsilateral is present at normal levels  LEFT:   Contralateral is present at normal levels    Speech  Reception Threshold:    RIGHT: 5 dB HL    LEFT:   60 dB HL    Word Recognition Score:     RIGHT: 100% at 50 dB HL using NU-6 recorded word list.    LEFT:   8% at 80 dB HL using NU-6 recorded word list.    A listening check on the right Phonak Audeo B50 with left Phonak CROS B transmitter revealed that the devices were working well with no distortion or weakness noted.  An electroacoustic analysis revealed that the right hearing aid was functioning appropriately.  The devices were thoroughly cleaned in office.  Because the hearing was stable in the right ear and the patient reported good sound quality with the devices when she is able to wear it, no programming changes were made.    ASSESSMENT:   Compared to patient's previous audiogram dated 6/14/17, hearing has remained essentially stable with 15 dB decreases noted 3-4 kHz in the left ear. Today s results were discussed with the patient in detail. A hearing aid check was performed.    PLAN:  Patient was counseled regarding hearing loss and impact on communication. It is recommended that the patient follow-up with Dr. Sylvester regarding the bilateral otalgia.  It is recommended that she return for hearing aid check every 8-12 months, sooner if concerns arise.  It is also recommended that she continue to monitor hearing status annually, sooner if concerns arise.  Please call this clinic with questions regarding these results or recommendations.    Marcell Salas  Audiologist  [place] License  #[id number]

## 2018-06-22 NOTE — MR AVS SNAPSHOT
After Visit Summary   2018    Leyda Scruggs    MRN: [id number]           Patient Information     Date Of Birth          1960        Visit Information        Provider Department      2018 9:00 AM Vinita Jones AuD [facility] Audiology        Today's Diagnoses     Sensorineural hearing loss (SNHL) of left ear with unrestricted hearing of right ear    -  1    Tinnitus of right ear        Otalgia of both ears           Follow-ups after your visit        Your next 10 appointments already scheduled     2018 10:45 AM CDT   (Arrive by 10:30 AM)   Return Visit with Luis Sylvester MD   [facility] Ear Nose and Throat ([facility] [place])    [address]  [address] [facility] [address] [phone]              Future tests that were ordered for you today     Open Future Orders        Priority Expected Expires Ordered    AUDIOLOGY ADULT REFERRAL Routine  2018            Who to contact     Please call your clinic at [phone] to:    Ask questions about your health    Make or cancel appointments    Discuss your medicines    Learn about your test results    Speak to your doctor            Additional Information About Your Visit        MyChart Information     [employer] is an electronic gateway that provides easy, online access to your medical records. With [employer], you can request a clinic appointment, read your test results, renew a prescription or communicate with your care team.     To sign up for [employer] visit the website at [email or web address]   You will be asked to enter the access code listed below, as well as some personal information. Please follow the directions to create your username and password.     Your access code is: [email or web address]  Expires: 2018  6:30 AM     Your access code will  in 90 days. If you need help or a new code, please contact your [facility] or call [phone]  for assistance.        Care EveryWhere ID     This is your Care EveryWhere ID. This could be used by other organizations to access your [place] medical records  [id number]         Blood Pressure from Last 3 Encounters:   No data found for BP    Weight from Last 3 Encounters:   06/14/17 59.4 kg (131 lb)              We Performed the Following     AUDIOGRAM/TYMPANOGRAM - INTERFACE     [facility] Eval & Speech Recog ([id number])     No Charge, Hearing Aid Clinic Visit     Tymps / Reflex   ([id number])        Primary Care Provider Office Phone # Fax #    Vinnie Hernandez [phone]       [facility] [address] [place] [address]        Equal Access to Services     AILEEN ZUNIGA : Hadii aad ku hadasho Soomaali, waaxda luqadaha, qaybta kaalmada adeegyada, waxay idiin haygraciela blackmon . So [facility] [phone].    ATENCIÓN: Si habla español, tiene a tom disposición servicios gratuitos de asistencia lingüística. Llame al [phone].    We comply with applicable federal civil rights laws and [place] laws. We do not discriminate on the basis of race, color, national origin, age, disability, sex, sexual orientation, or gender identity.            Thank you!     Thank you for choosing [facility] AUDIOLOGY  for your care. Our goal is always to provide you with excellent care. Hearing back from our patients is one way we can continue to improve our services. Please take a few minutes to complete the written survey that you may receive in the mail after your visit with us. Thank you!             Your Updated Medication List - Protect others around you: Learn how to safely use, store and throw away your medicines at www.disposemymeds.org.          This list is accurate as of 6/22/18 10:15 AM.  Always use your most recent med list.                   Brand Name Dispense Instructions for use Diagnosis    calcium citrate 950 MG tablet    CALCITRATE     Take 1 tablet by mouth daily        folic  acid 1 MG tablet    FOLVITE     TAKE 1 TABLET BY ORAL ROUTE EVERY MORNING        methotrexate 2.5 MG tablet CHEMO      2.5 mg        predniSONE 5 MG tablet    DELTASONE     Current dose 7.5 mg once daily        spironolactone 50 MG tablet    ALDACTONE    30 tablet    Take 1 tablet (50 mg) by mouth daily    Meniere's disease

## 2018-06-22 NOTE — PATIENT INSTRUCTIONS
You will have an MRI done, you will be called with the results.  Stop the spironolactone.   Please call our clinic for any questions,concerns,or worsening symptoms.      Clinic #[phone]       Option 3  for the triage nurse.

## 2018-06-22 NOTE — PROGRESS NOTES
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 She reports that the tinnitus is if anything a little bit worse and that she is really bothered by it. She is unable to hear and reports that now her right ear also bothers her. She has some increase in tinnitus on that side as well.  She denies any symptoms of dizziness or vertigo.  In terms of her general health she is been doing well.    Physical Exam: Exam today shows that her as minimal cerumen on the left side. I removed this with an alligator forceps. The remainder of the exam is healthy. Her facial nerve is normal with House Brackmann class I bilaterally. Examination of the eye movements does not reveal obvious nystagmus.    Imaging: I went back over her imaging from 2015 and found that the imaging has an area just at the lateral end of the left canal which is questionable for enhancement.    Audiogram: Today's audiogram indicates that there is a progression of the hearing loss in left ear. This is not so apparent in her pure tone average, but in word recognition score she has declined substantially. The right better hearing ear has no change in the pure-tone average. A note though that some frequencies have changed about 10 dB in the higher tones.    Impression/Plan: In spite of treatment with diuretics, she is not really improved. This is an unfortunate finding. I cannot easily account for it. The diuretic does not seem to be helping her and therefore I will discontinue it. Because of the finding in 2015 and the progression of her word recognition score, I will reorder an MRI scan. She should continue annual follow-ups.      CACHORRO/ms

## 2018-06-23 RX ORDER — SPIRONOLACTONE 50 MG/1
50 TABLET, FILM COATED ORAL DAILY
Qty: 30 TABLET | Refills: 0 | Status: SHIPPED | OUTPATIENT
Start: 2018-06-23

## 2018-06-24 ENCOUNTER — HOSPITAL ENCOUNTER (OUTPATIENT)
Dept: MRI IMAGING | Facility: CLINIC | Age: 58
Discharge: HOME | End: 2018-06-24
Attending: OTOLARYNGOLOGY | Admitting: OTOLARYNGOLOGY
Payer: COMMERCIAL

## 2018-06-24 DIAGNOSIS — H91.90 HEARING LOSS: ICD-10-CM

## 2018-06-24 DIAGNOSIS — H93.12 TINNITUS, LEFT: ICD-10-CM

## 2018-06-24 PROCEDURE — 25000128 H RX IP 250 OP 636: Performed by: OTOLARYNGOLOGY

## 2018-06-24 PROCEDURE — A9585 GADOBUTROL INJECTION: HCPCS | Performed by: OTOLARYNGOLOGY

## 2018-06-24 PROCEDURE — 70553 MRI BRAIN STEM W/O & W/DYE: CPT

## 2018-06-24 RX ORDER — GADOBUTROL 604.72 MG/ML
6 INJECTION INTRAVENOUS ONCE
Status: COMPLETED | OUTPATIENT
Start: 2018-06-24 | End: 2018-06-24

## 2018-06-24 RX ADMIN — GADOBUTROL 6 ML: 604.72 INJECTION INTRAVENOUS at 07:44

## 2018-06-28 ENCOUNTER — CARE COORDINATION (OUTPATIENT)
Dept: OTOLARYNGOLOGY | Facility: CLINIC | Age: 58
End: 2018-06-28

## 2018-06-28 NOTE — PROGRESS NOTES
Yes   The scan is still negative for a tumor and the old area of question has apparently cleared   6-28-18 Dr. Sylvester has reviewed the MRI done 6-24-18, the above message left on pt voice mail.  Noemi Marie RN  ENT Care Coordinator   Otology  [phone]  6/28/2018 1:39 PM

## 2021-04-11 ENCOUNTER — HOSPITAL ENCOUNTER (EMERGENCY)
Facility: CLINIC | Age: 61
Discharge: HOME | End: 2021-04-11
Attending: EMERGENCY MEDICINE | Admitting: EMERGENCY MEDICINE
Payer: COMMERCIAL

## 2021-04-11 ENCOUNTER — APPOINTMENT (OUTPATIENT)
Dept: GENERAL RADIOLOGY | Facility: CLINIC | Age: 61
End: 2021-04-11
Attending: EMERGENCY MEDICINE
Payer: COMMERCIAL

## 2021-04-11 VITALS
HEART RATE: 62 BPM | SYSTOLIC BLOOD PRESSURE: 136 MMHG | RESPIRATION RATE: 17 BRPM | OXYGEN SATURATION: 98 % | TEMPERATURE: 98.5 F | DIASTOLIC BLOOD PRESSURE: 86 MMHG

## 2021-04-11 DIAGNOSIS — R05.9 COUGH: ICD-10-CM

## 2021-04-11 DIAGNOSIS — B34.9 VIRAL ILLNESS: ICD-10-CM

## 2021-04-11 LAB
ANION GAP SERPL CALCULATED.3IONS-SCNC: 2 MMOL/L (ref 3–14)
BASOPHILS # BLD AUTO: 0 10E9/L (ref 0–0.2)
BASOPHILS NFR BLD AUTO: 0.4 %
BUN SERPL-MCNC: 18 MG/DL (ref 7–30)
CALCIUM SERPL-MCNC: 9.2 MG/DL (ref 8.5–10.1)
CHLORIDE SERPL-SCNC: 104 MMOL/L (ref 94–109)
CO2 SERPL-SCNC: 32 MMOL/L (ref 20–32)
CREAT SERPL-MCNC: 1.08 MG/DL (ref 0.52–1.04)
D DIMER PPP FEU-MCNC: <0.3 UG/ML FEU (ref 0–0.5)
DIFFERENTIAL METHOD BLD: NORMAL
EOSINOPHIL # BLD AUTO: 0.1 10E9/L (ref 0–0.7)
EOSINOPHIL NFR BLD AUTO: 2.2 %
ERYTHROCYTE [DISTWIDTH] IN BLOOD BY AUTOMATED COUNT: 13.7 % (ref 10–15)
FLUAV RNA RESP QL NAA+PROBE: NEGATIVE
FLUBV RNA RESP QL NAA+PROBE: NEGATIVE
GFR SERPL CREATININE-BSD FRML MDRD: 55 ML/MIN/{1.73_M2}
GLUCOSE SERPL-MCNC: 84 MG/DL (ref 70–99)
HCT VFR BLD AUTO: 41.1 % (ref 35–47)
HGB BLD-MCNC: 13.1 G/DL (ref 11.7–15.7)
IMM GRANULOCYTES # BLD: 0 10E9/L (ref 0–0.4)
IMM GRANULOCYTES NFR BLD: 0.2 %
LABORATORY COMMENT REPORT: NORMAL
LYMPHOCYTES # BLD AUTO: 1.3 10E9/L (ref 0.8–5.3)
LYMPHOCYTES NFR BLD AUTO: 24 %
MCH RBC QN AUTO: 29.6 PG (ref 26.5–33)
MCHC RBC AUTO-ENTMCNC: 31.9 G/DL (ref 31.5–36.5)
MCV RBC AUTO: 93 FL (ref 78–100)
MONOCYTES # BLD AUTO: 0.6 10E9/L (ref 0–1.3)
MONOCYTES NFR BLD AUTO: 11 %
NEUTROPHILS # BLD AUTO: 3.4 10E9/L (ref 1.6–8.3)
NEUTROPHILS NFR BLD AUTO: 62.2 %
NRBC # BLD AUTO: 0 10*3/UL
NRBC BLD AUTO-RTO: 0 /100
PLATELET # BLD AUTO: 222 10E9/L (ref 150–450)
POTASSIUM SERPL-SCNC: 4.1 MMOL/L (ref 3.4–5.3)
RBC # BLD AUTO: 4.43 10E12/L (ref 3.8–5.2)
RSV RNA SPEC QL NAA+PROBE: NORMAL
SARS-COV-2 RNA RESP QL NAA+PROBE: NEGATIVE
SODIUM SERPL-SCNC: 138 MMOL/L (ref 133–144)
SPECIMEN SOURCE: NORMAL
TROPONIN I SERPL-MCNC: <0.015 UG/L (ref 0–0.04)
WBC # BLD AUTO: 5.5 10E9/L (ref 4–11)

## 2021-04-11 PROCEDURE — 93005 ELECTROCARDIOGRAM TRACING: CPT

## 2021-04-11 PROCEDURE — 71045 X-RAY EXAM CHEST 1 VIEW: CPT

## 2021-04-11 PROCEDURE — 96360 HYDRATION IV INFUSION INIT: CPT

## 2021-04-11 PROCEDURE — 87636 SARSCOV2 & INF A&B AMP PRB: CPT | Performed by: EMERGENCY MEDICINE

## 2021-04-11 PROCEDURE — 85025 COMPLETE CBC W/AUTO DIFF WBC: CPT | Performed by: EMERGENCY MEDICINE

## 2021-04-11 PROCEDURE — 84484 ASSAY OF TROPONIN QUANT: CPT | Performed by: EMERGENCY MEDICINE

## 2021-04-11 PROCEDURE — 99285 EMERGENCY DEPT VISIT HI MDM: CPT | Mod: 25

## 2021-04-11 PROCEDURE — 96361 HYDRATE IV INFUSION ADD-ON: CPT

## 2021-04-11 PROCEDURE — C9803 HOPD COVID-19 SPEC COLLECT: HCPCS

## 2021-04-11 PROCEDURE — 85379 FIBRIN DEGRADATION QUANT: CPT | Performed by: EMERGENCY MEDICINE

## 2021-04-11 PROCEDURE — 80048 BASIC METABOLIC PNL TOTAL CA: CPT | Performed by: EMERGENCY MEDICINE

## 2021-04-11 PROCEDURE — 258N000003 HC RX IP 258 OP 636: Performed by: EMERGENCY MEDICINE

## 2021-04-11 RX ADMIN — SODIUM CHLORIDE 1000 ML: 9 INJECTION, SOLUTION INTRAVENOUS at 12:10

## 2021-04-11 ASSESSMENT — ENCOUNTER SYMPTOMS
VOMITING: 0
FATIGUE: 1
SHORTNESS OF BREATH: 1
DIARRHEA: 1
COUGH: 1
MYALGIAS: 1

## 2021-04-11 NOTE — ED PROVIDER NOTES
History   Chief Complaint:  Shortness of Breath     HPI   Leyda Scruggs is a 60 year old female with history of thyroid disease who presents with shortness of breath. Patient states she has had a sore throat develop on 04/04/21. She then developed congestion and had a dry cough begin four days ago. She has intermittent shortness of breath when ambulating. She has intermittent diarrhea at baseline. She eats soft food due to increased fatigue and myalgias. Denies abnormal chest pain or abdominal pain. She denies vomiting or loss of taste or smell. She has not been tested for COVID but went to [place] on 03/31/21 for her son's wedding and traveled via airplane. She traveled with people who were vaccinated. She denies tobacco use.     Review of Systems   Constitutional: Positive for fatigue.   HENT: Positive for congestion.    Respiratory: Positive for cough and shortness of breath.    Gastrointestinal: Positive for diarrhea. Negative for vomiting.   Musculoskeletal: Positive for myalgias.   All other systems reviewed and are negative.        Allergies:  No Clinical Screening - See Comments  Novocain [Procaine]  Terbinafine    Medications:  Plaquenil  Prednisone   Aldactone     Past Medical History:    Allergic Rhinitis  Arthritis   BPV  Conductive Hearing Loss  Meniere's Disease  Migraines   Osteoarthritis   Recurrent Otitis Media   Rheumatoid Arthritis   Thyroid Disease  Tinnitus     Past Surgical History:    Elbow Surgery   Orthopedic Surgery - Dislocated Elbow   [place] Teeth Removed     Family History:    Father - Substance Abuse  Mother - Thyroid Disease    Social History:  Arrives unaccompanied.     Physical Exam     Patient Vitals for the past 24 hrs:   BP Temp Temp src Pulse Resp SpO2   04/11/21 1259 -- -- -- -- -- 100 %   04/11/21 1243 -- -- -- -- -- 99 %   04/11/21 1116 121/76 98.5  F (36.9  C) Temporal 69 17 99 %       Physical Exam    Physical Exam   Constitutional:  Patient is oriented to person, place, and  time. They appear well-developed and well-nourished. Mild distress secondary to cough.    HENT:   Mouth/Throat:   Oropharynx is clear and moist.   Eyes:    Conjunctivae normal and EOM are normal. Pupils are equal, round, and reactive to light.   Neck:    Normal range of motion.   Cardiovascular: Normal rate, regular rhythm and normal heart sounds.  Exam reveals no gallop and no friction rub.  No murmur heard.  Pulmonary/Chest:  Effort normal and breath sounds normal. Patient has no wheezes. Patient has no rales.   Abdominal:   Soft. Bowel sounds are normal. Patient exhibits no mass. There is no tenderness. There is no rebound and no guarding.   Musculoskeletal:  Normal range of motion. Patient exhibits no edema.   Neurological:   Patient is alert and oriented to person, place, and time. Patient has normal strength. No cranial nerve deficit or sensory deficit. GCS 15  Skin:   Skin is warm and dry. No rash noted. No erythema.   Psychiatric:   Patient has a normal mood and affect. Patient's behavior is normal. Judgment and thought content normal.     Emergency Department Course   ECG  ECG taken at 1122, ECG read at 1130  Normal sinus rhythm   Normal ECG    Rate 63 bpm. DE interval 152 ms. QRS duration 80 ms. QT/QTc 430/440 ms. P-R-T axes 69 53 45.     Imaging:  XR Chest Port 1 View  No evidence of acute cardiopulmonary disease is seen.  Reading per radiology    Laboratory:  CBC: WBC 5.5, HGB 13.1,   BMP: Creatinine 1.08 (H), Anion Gap 2 (L), GFR 55 (L), o/w WNL    D Dimer (Resulted 1240): <0.3  Troponin (Resulted 1250): <0.015    Symptomatic Influenza A/B & SARS-COVIS-19 Virus PCR: Negative    Emergency Department Course:    Reviewed:  I reviewed nursing notes and past medical history    Assessments:  1149 I obtained history and examined the patient as noted above.   1336 I rechecked the patient and explained findings.     Interventions:  1210 0.9% NaCl bolus 1,000 mL IV    Disposition:  The patient was  discharged to home.     Impression & Plan   Medical Decision Making:  Leyda Scruggs is a 60 year old female who presents to the emergency department today with fever, cough, malaise, and symptoms of likely viral syndrome. A broad ddx was considered including viral and bacterial causes of infection including URI, pharyngitis, bronchitis, pneumonia, influenza, COVID-19, OM, Strep pharyngitis, sinus infection, among others. Clinically the patient is well appearing without increased work of breathing, respiratory distress, hypoxia, signs of ARDS or other serious decompensation or complication. Clinical signs and symptoms are not consistent with meningitis or sepsis. She was tested for COVID-19 an although it has returned negative, I am still highly suspicious of some viral product.  Chest x-ray was performed that was negative and D-dimer also negative making PE unlikely.     I recommended supportive care for treatment of likely underlying viral syndrome including possible COVID-19, influenza and others. Tylenol for fever control. Good oral fluid intake. Discussed the importance of home quarantine and CDC guidelines on self-quarantine were provided as part of discharge instructions. No indication for hospitalization at this time. Return precautions were discussed with patient. The patient's questions were answered and the patient was agreeable with discharge.    Covid-19  Leyda Scruggs was evaluated during a global COVID-19 pandemic, which necessitated consideration that the patient might be at risk for infection with the SARS-CoV-2 virus that causes COVID-19.   Applicable protocols for evaluation were followed during the patient's care. COVID-19 was considered as part of the patient's evaluation. The plan for testing is: a test was obtained during this visit which returned normal.    Diagnosis:    ICD-10-CM    1. Cough  R05    2. Viral illness  B34.9      Scribe Disclosure:  IMaurilio, am serving as a scribe  at 11:27 AM on 4/11/2021 to document services personally performed by Татьяна Lawson MD based on my observations and the provider's statements to me.              Татьяна Lawson MD  04/13/21 0708

## 2021-04-12 LAB — INTERPRETATION ECG - MUSE: NORMAL

## 2021-12-03 ENCOUNTER — HOSPITAL ENCOUNTER (OUTPATIENT)
Dept: MAMMOGRAPHY | Facility: CLINIC | Age: 61
Discharge: HOME | End: 2021-12-03
Attending: FAMILY MEDICINE | Admitting: FAMILY MEDICINE
Payer: COMMERCIAL

## 2021-12-03 DIAGNOSIS — Z12.31 VISIT FOR SCREENING MAMMOGRAM: ICD-10-CM

## 2021-12-03 PROCEDURE — 77067 SCR MAMMO BI INCL CAD: CPT

## 2022-06-20 ENCOUNTER — APPOINTMENT (OUTPATIENT)
Dept: GENERAL RADIOLOGY | Facility: CLINIC | Age: 62
End: 2022-06-20
Attending: EMERGENCY MEDICINE
Payer: COMMERCIAL

## 2022-06-20 ENCOUNTER — HOSPITAL ENCOUNTER (EMERGENCY)
Facility: CLINIC | Age: 62
Discharge: HOME | End: 2022-06-20
Attending: EMERGENCY MEDICINE | Admitting: EMERGENCY MEDICINE
Payer: COMMERCIAL

## 2022-06-20 VITALS
OXYGEN SATURATION: 98 % | TEMPERATURE: 97.7 F | SYSTOLIC BLOOD PRESSURE: 119 MMHG | HEART RATE: 61 BPM | RESPIRATION RATE: 18 BRPM | HEIGHT: 64 IN | DIASTOLIC BLOOD PRESSURE: 79 MMHG | BODY MASS INDEX: 23.05 KG/M2 | WEIGHT: 135 LBS

## 2022-06-20 DIAGNOSIS — R55 NEAR SYNCOPE: ICD-10-CM

## 2022-06-20 LAB
ALBUMIN SERPL-MCNC: 3 G/DL (ref 3.4–5)
ALP SERPL-CCNC: 103 U/L (ref 40–150)
ALT SERPL W P-5'-P-CCNC: 35 U/L (ref 0–50)
ANION GAP SERPL CALCULATED.3IONS-SCNC: 3 MMOL/L (ref 3–14)
AST SERPL W P-5'-P-CCNC: 39 U/L (ref 0–45)
ATRIAL RATE - MUSE: 60 BPM
BASOPHILS # BLD AUTO: 0 10E3/UL (ref 0–0.2)
BASOPHILS NFR BLD AUTO: 1 %
BILIRUB SERPL-MCNC: 0.4 MG/DL (ref 0.2–1.3)
BUN SERPL-MCNC: 22 MG/DL (ref 7–30)
CALCIUM SERPL-MCNC: 8.4 MG/DL (ref 8.5–10.1)
CHLORIDE BLD-SCNC: 106 MMOL/L (ref 94–109)
CO2 SERPL-SCNC: 26 MMOL/L (ref 20–32)
CREAT SERPL-MCNC: 0.97 MG/DL (ref 0.52–1.04)
D DIMER PPP FEU-MCNC: <0.27 UG/ML FEU (ref 0–0.5)
DIASTOLIC BLOOD PRESSURE - MUSE: NORMAL MMHG
EOSINOPHIL # BLD AUTO: 0.1 10E3/UL (ref 0–0.7)
EOSINOPHIL NFR BLD AUTO: 1 %
ERYTHROCYTE [DISTWIDTH] IN BLOOD BY AUTOMATED COUNT: 14.5 % (ref 10–15)
GFR SERPL CREATININE-BSD FRML MDRD: 66 ML/MIN/1.73M2
GLUCOSE BLD-MCNC: 112 MG/DL (ref 70–99)
HCT VFR BLD AUTO: 36.3 % (ref 35–47)
HGB BLD-MCNC: 11.9 G/DL (ref 11.7–15.7)
HOLD SPECIMEN: NORMAL
IMM GRANULOCYTES # BLD: 0 10E3/UL
IMM GRANULOCYTES NFR BLD: 0 %
INTERPRETATION ECG - MUSE: NORMAL
LYMPHOCYTES # BLD AUTO: 0.7 10E3/UL (ref 0.8–5.3)
LYMPHOCYTES NFR BLD AUTO: 16 %
MCH RBC QN AUTO: 31.1 PG (ref 26.5–33)
MCHC RBC AUTO-ENTMCNC: 32.8 G/DL (ref 31.5–36.5)
MCV RBC AUTO: 95 FL (ref 78–100)
MONOCYTES # BLD AUTO: 0.4 10E3/UL (ref 0–1.3)
MONOCYTES NFR BLD AUTO: 10 %
NEUTROPHILS # BLD AUTO: 3.3 10E3/UL (ref 1.6–8.3)
NEUTROPHILS NFR BLD AUTO: 72 %
NRBC # BLD AUTO: 0 10E3/UL
NRBC BLD AUTO-RTO: 0 /100
P AXIS - MUSE: 63 DEGREES
PLATELET # BLD AUTO: 181 10E3/UL (ref 150–450)
POTASSIUM BLD-SCNC: 4.3 MMOL/L (ref 3.4–5.3)
PR INTERVAL - MUSE: 164 MS
PROT SERPL-MCNC: 5.9 G/DL (ref 6.8–8.8)
QRS DURATION - MUSE: 84 MS
QT - MUSE: 458 MS
QTC - MUSE: 458 MS
R AXIS - MUSE: 28 DEGREES
RBC # BLD AUTO: 3.83 10E6/UL (ref 3.8–5.2)
SODIUM SERPL-SCNC: 135 MMOL/L (ref 133–144)
SYSTOLIC BLOOD PRESSURE - MUSE: NORMAL MMHG
T AXIS - MUSE: 34 DEGREES
TROPONIN I SERPL HS-MCNC: <3 NG/L
TSH SERPL DL<=0.005 MIU/L-ACNC: 2.24 MU/L (ref 0.4–4)
VENTRICULAR RATE- MUSE: 60 BPM
WBC # BLD AUTO: 4.6 10E3/UL (ref 4–11)

## 2022-06-20 PROCEDURE — 84443 ASSAY THYROID STIM HORMONE: CPT | Performed by: EMERGENCY MEDICINE

## 2022-06-20 PROCEDURE — 85379 FIBRIN DEGRADATION QUANT: CPT | Performed by: EMERGENCY MEDICINE

## 2022-06-20 PROCEDURE — 93005 ELECTROCARDIOGRAM TRACING: CPT

## 2022-06-20 PROCEDURE — 85004 AUTOMATED DIFF WBC COUNT: CPT | Performed by: EMERGENCY MEDICINE

## 2022-06-20 PROCEDURE — 84484 ASSAY OF TROPONIN QUANT: CPT | Performed by: EMERGENCY MEDICINE

## 2022-06-20 PROCEDURE — 99285 EMERGENCY DEPT VISIT HI MDM: CPT | Mod: 25

## 2022-06-20 PROCEDURE — 36415 COLL VENOUS BLD VENIPUNCTURE: CPT | Performed by: EMERGENCY MEDICINE

## 2022-06-20 PROCEDURE — 80053 COMPREHEN METABOLIC PANEL: CPT | Performed by: EMERGENCY MEDICINE

## 2022-06-20 PROCEDURE — 71046 X-RAY EXAM CHEST 2 VIEWS: CPT

## 2022-06-20 ASSESSMENT — ENCOUNTER SYMPTOMS
DIZZINESS: 1
NUMBNESS: 1
FEVER: 0
NAUSEA: 1
BACK PAIN: 0

## 2022-06-20 NOTE — ED TRIAGE NOTES
Pt was at work this morning and felt sudden onset of lightheadedness, did not lose consciousness but was lowered to floor by coworkers. Patient states this has happened in past. Hx of TBI, hypotension, afib (20 years ago)     Triage Assessment     Row Name 06/20/22 0912       Respiratory WDL    Respiratory WDL WDL       Cardiac WDL    Cardiac WDL X;all       Chest Pain Assessment    Associated Signs/Symptoms dizziness    Chest Pain Intervention cardiac biomarkers drawn;cardiac monitor placed;12-lead ECG obtained       Cognitive/Neuro/Behavioral WDL    Cognitive/Neuro/Behavioral WDL WDL

## 2022-06-20 NOTE — ED PROVIDER NOTES
"  History     Chief Complaint:  Dizziness      The history is provided by the patient.      Leyda Scruggs is a 61 year old female with a history of atrial fibrillation and Grave's diseaes who presents with dizziness. Earlier today, she was having a conversation with some coworkers when she felt \"a wave of dizziness\". She then started to fall, but a coworker was able to catch her and lowered her to the floor. While she was sitting on the floor, her feet and hands started tingling. She got up and drank some water, but she developed nausea and the tingling worsened. EMS was called and she was brought to the ED. Prior to the onset of her dizziness, she did not have any other symptoms today. She claims to have ate breakfast this morning. She denies vomiting, diarrhea, fever, leg swelling/pain, back pain or urinary symptoms.     Review of Systems   Constitutional: Negative for fever.   Cardiovascular: Negative for leg swelling.   Gastrointestinal: Positive for nausea.   Genitourinary: Negative.    Musculoskeletal: Negative for back pain.   Neurological: Positive for dizziness and numbness (Tingling).   All other systems reviewed and are negative.    Allergies:  Novocain [Procaine]  Other [No Clinical Screening - See Comments]  Terbinafine    Medications:    Plaquenil   Methotrexate   Deltasone   Aldactone     Past Medical History:    Allergic rhinitis   Arthritis   Autoimmune disease (H)   Benign positional vertigo   Conductive hearing loss   Head injury   Heart rate problem   Meniere's disease   Migraines   Osteoarthritis   Recurrent otitis media   Reduced vision   Rheumatoid arthritis flare (H)   Thyroid disease  Tinnitus   A-fib   Grave's disease    Shingles     Past Surgical History:    L elbow surgery   [place] teeth extraction     Family History:    Father - substance abuse, heart disease   Mother - thyroid disease, myasthenia gravis    Social History:  The patient presents to the ED alone via private vehicle " "    Physical Exam     Patient Vitals for the past 24 hrs:   BP Temp Temp src Pulse Resp SpO2 Height Weight   06/20/22 1310 119/79 -- -- 61 -- -- -- --   06/20/22 1142 118/74 -- -- 60 18 -- -- --   06/20/22 1115 -- -- -- 58 15 98 % -- --   06/20/22 1030 -- -- -- 55 (!) 5 98 % -- --   06/20/22 0917 -- -- -- -- 16 -- -- --   06/20/22 0910 123/78 97.7  F (36.5  C) Oral 56 -- 97 % 1.626 m (5' 4\") 61.2 kg (135 lb)       Physical Exam  Physical Exam   Nursing note and vitals reviewed.  General: Oriented to person, place, and time. Appears well-developed and well-nourished.   Head: No signs of trauma.   Mouth/Throat: Oropharynx is clear and moist.   Eyes: Conjunctivae are normal. Pupils are equal, round, and reactive to light.   Neck: Normal range of motion. No nuchal rigidity.   Cardiovascular: Normal rate and regular rhythm.    Respiratory: Effort normal and breath sounds normal. No respiratory distress.   Abdominal: Soft. There is no tenderness. There is no guarding.   Musculoskeletal: Normal range of motion. no edema.   Neurological: The patient is alert and oriented to person, place, and time.  PERRLA, EOMI, visual fields intact, strength in upper/lower extremities normal and symmetrical.   Sensation normal. Speech normal  GCS eye subscore is 4. GCS verbal subscore is 5. GCS motor subscore is 6.   Skin: Skin is warm and dry. No rash noted.   Psychiatric: normal mood and affect. behavior is normal.     Emergency Department Course   ECG:  ECG taken at 0913, ECG read at 0918  Normal sinus rhythm   Normal ECG   No significant change as compared to prior, dated 4/11/2021.  Rate 60 bpm. AR interval 164 ms. QRS duration 84 ms. QT/QTc 458/458 ms. P-R-T axes 63 28 34.     Imaging:  XR Chest 2 Views  Preliminary Result  IMPRESSION: Mild infiltrate or atelectasis at the left lung base. The  lungs are otherwise clear. No pleural effusions. Heart size and  pulmonary vascularity are within normal limits.    Report per " radiology    Laboratory:  Labs Ordered and Resulted from Time of ED Arrival to Time of ED Departure   COMPREHENSIVE METABOLIC PANEL - Abnormal       Result Value    Sodium 135      Potassium 4.3      Chloride 106      Carbon Dioxide (CO2) 26      Anion Gap 3      Urea Nitrogen 22      Creatinine 0.97      Calcium 8.4 (*)     Glucose 112 (*)     Alkaline Phosphatase 103      AST 39      ALT 35      Protein Total 5.9 (*)     Albumin 3.0 (*)     Bilirubin Total 0.4      GFR Estimate 66     CBC WITH PLATELETS AND DIFFERENTIAL - Abnormal    WBC Count 4.6      RBC Count 3.83      Hemoglobin 11.9      Hematocrit 36.3      MCV 95      MCH 31.1      MCHC 32.8      RDW 14.5      Platelet Count 181      % Neutrophils 72      % Lymphocytes 16      % Monocytes 10      % Eosinophils 1      % Basophils 1      % Immature Granulocytes 0      NRBCs per 100 WBC 0      Absolute Neutrophils 3.3      Absolute Lymphocytes 0.7 (*)     Absolute Monocytes 0.4      Absolute Eosinophils 0.1      Absolute Basophils 0.0      Absolute Immature Granulocytes 0.0      Absolute NRBCs 0.0     TROPONIN I - Normal    Troponin I High Sensitivity <3     TSH WITH FREE T4 REFLEX - Normal    TSH 2.24     D DIMER QUANTITATIVE - Normal    D-Dimer Quantitative <0.27       Emergency Department Course:    Reviewed:  I reviewed nursing notes, vitals and past medical history    Assessments:  0920 I obtained history and examined the patient as noted above.   1255 I rechecked the patient and explained findings. I discussed plan for discharge home.    Disposition:  The patient was discharged to home.     Impression & Plan    Medical Decision Making:  Leyda Scruggs is a 61 year old female who presents for evaluation of near-syncope.  She did not have actual syncope.  The differential for near-syncope is broad and includes etiologies such as cardiac arrythmia, ACS, aortic stenosis, HOCM, PE, orthostatic hypotension, drugs, situational, carotid hypersensitivity, seizure,  "TIA, stroke, vasovagal.  There are no signs of a concerning etiology for near- syncope at this point.  In addition, there is no family history of sudden death, no chest pain, no seizure activity or post-ictal period, no murmur, and no signs of orthostasis in the ED, no focal neurologic symptoms, and no complaints of concerning headache.  The workup in the ED is negative and the physical exam is re-assurring.  Supportive outpatient management is therefore indicated.    She does not have clinical signs of pneumonia.  I am going to give her a \"wait and see\" prescription for Augmentin for pneumonia if she were to develop pneumonias like symptoms..     Diagnosis:    ICD-10-CM    1. Near syncope  R55        Discharge Medications:  Discharge Medication List as of 6/20/2022  1:07 PM      START taking these medications    Details   amoxicillin-clavulanate (AUGMENTIN) 875-125 MG tablet Take 1 tablet by mouth 2 times daily for 7 days, Disp-14 tablet, R-0, Local Print             Scribe Disclosure:  I, Ty Lei, am serving as a scribe at 9:19 AM on 6/20/2022 to document services personally performed by Lincoln Sanabria MD based on my observations and the provider's statements to me.      Lincoln Sanabria MD  06/20/22 0394    "

## 2022-06-20 NOTE — ED NOTES
Bed: ED29  Expected date:   Expected time:   Means of arrival:   Comments:  [place] - 61 F syncope eta 0831

## 2023-12-11 ENCOUNTER — APPOINTMENT (OUTPATIENT)
Dept: CT IMAGING | Facility: CLINIC | Age: 63
End: 2023-12-11
Attending: STUDENT IN AN ORGANIZED HEALTH CARE EDUCATION/TRAINING PROGRAM
Payer: COMMERCIAL

## 2023-12-11 ENCOUNTER — HOSPITAL ENCOUNTER (EMERGENCY)
Facility: CLINIC | Age: 63
Discharge: HOME | End: 2023-12-12
Attending: EMERGENCY MEDICINE | Admitting: EMERGENCY MEDICINE
Payer: COMMERCIAL

## 2023-12-11 DIAGNOSIS — J06.9 UPPER RESPIRATORY TRACT INFECTION, UNSPECIFIED TYPE: ICD-10-CM

## 2023-12-11 DIAGNOSIS — R79.89 ELEVATED D-DIMER: ICD-10-CM

## 2023-12-11 LAB
ANION GAP SERPL CALCULATED.3IONS-SCNC: 10 MMOL/L (ref 7–15)
ATRIAL RATE - MUSE: 66 BPM
BASOPHILS # BLD AUTO: 0 10E3/UL (ref 0–0.2)
BASOPHILS NFR BLD AUTO: 1 %
BUN SERPL-MCNC: 16.5 MG/DL (ref 8–23)
CALCIUM SERPL-MCNC: 9.2 MG/DL (ref 8.8–10.2)
CHLORIDE SERPL-SCNC: 102 MMOL/L (ref 98–107)
CREAT SERPL-MCNC: 1.12 MG/DL (ref 0.51–0.95)
DEPRECATED HCO3 PLAS-SCNC: 29 MMOL/L (ref 22–29)
DIASTOLIC BLOOD PRESSURE - MUSE: NORMAL MMHG
EGFRCR SERPLBLD CKD-EPI 2021: 55 ML/MIN/1.73M2
EOSINOPHIL # BLD AUTO: 0.1 10E3/UL (ref 0–0.7)
EOSINOPHIL NFR BLD AUTO: 2 %
ERYTHROCYTE [DISTWIDTH] IN BLOOD BY AUTOMATED COUNT: 15 % (ref 10–15)
FLUAV RNA SPEC QL NAA+PROBE: NEGATIVE
FLUBV RNA RESP QL NAA+PROBE: NEGATIVE
GLUCOSE SERPL-MCNC: 119 MG/DL (ref 70–99)
HCT VFR BLD AUTO: 37.5 % (ref 35–47)
HGB BLD-MCNC: 12.2 G/DL (ref 11.7–15.7)
HOLD SPECIMEN: NORMAL
HOLD SPECIMEN: NORMAL
IMM GRANULOCYTES # BLD: 0 10E3/UL
IMM GRANULOCYTES NFR BLD: 0 %
INTERPRETATION ECG - MUSE: NORMAL
LYMPHOCYTES # BLD AUTO: 1.7 10E3/UL (ref 0.8–5.3)
LYMPHOCYTES NFR BLD AUTO: 37 %
MCH RBC QN AUTO: 30.5 PG (ref 26.5–33)
MCHC RBC AUTO-ENTMCNC: 32.5 G/DL (ref 31.5–36.5)
MCV RBC AUTO: 94 FL (ref 78–100)
MONOCYTES # BLD AUTO: 0.4 10E3/UL (ref 0–1.3)
MONOCYTES NFR BLD AUTO: 10 %
NEUTROPHILS # BLD AUTO: 2.3 10E3/UL (ref 1.6–8.3)
NEUTROPHILS NFR BLD AUTO: 50 %
NRBC # BLD AUTO: 0 10E3/UL
NRBC BLD AUTO-RTO: 0 /100
P AXIS - MUSE: 70 DEGREES
PLATELET # BLD AUTO: 238 10E3/UL (ref 150–450)
POTASSIUM SERPL-SCNC: 4.1 MMOL/L (ref 3.4–5.3)
PR INTERVAL - MUSE: 158 MS
QRS DURATION - MUSE: 88 MS
QT - MUSE: 426 MS
QTC - MUSE: 446 MS
R AXIS - MUSE: 59 DEGREES
RBC # BLD AUTO: 4 10E6/UL (ref 3.8–5.2)
RSV RNA SPEC NAA+PROBE: NEGATIVE
SARS-COV-2 RNA RESP QL NAA+PROBE: NEGATIVE
SODIUM SERPL-SCNC: 141 MMOL/L (ref 135–145)
SYSTOLIC BLOOD PRESSURE - MUSE: NORMAL MMHG
T AXIS - MUSE: 52 DEGREES
TROPONIN T SERPL HS-MCNC: 7 NG/L
VENTRICULAR RATE- MUSE: 66 BPM
WBC # BLD AUTO: 4.5 10E3/UL (ref 4–11)

## 2023-12-11 PROCEDURE — 80048 BASIC METABOLIC PNL TOTAL CA: CPT | Performed by: EMERGENCY MEDICINE

## 2023-12-11 PROCEDURE — 71275 CT ANGIOGRAPHY CHEST: CPT

## 2023-12-11 PROCEDURE — 87637 SARSCOV2&INF A&B&RSV AMP PRB: CPT | Performed by: EMERGENCY MEDICINE

## 2023-12-11 PROCEDURE — 85025 COMPLETE CBC W/AUTO DIFF WBC: CPT | Performed by: EMERGENCY MEDICINE

## 2023-12-11 PROCEDURE — 250N000011 HC RX IP 250 OP 636: Performed by: STUDENT IN AN ORGANIZED HEALTH CARE EDUCATION/TRAINING PROGRAM

## 2023-12-11 PROCEDURE — 93005 ELECTROCARDIOGRAM TRACING: CPT

## 2023-12-11 PROCEDURE — 250N000009 HC RX 250: Performed by: STUDENT IN AN ORGANIZED HEALTH CARE EDUCATION/TRAINING PROGRAM

## 2023-12-11 PROCEDURE — 99285 EMERGENCY DEPT VISIT HI MDM: CPT | Mod: 25

## 2023-12-11 PROCEDURE — 84484 ASSAY OF TROPONIN QUANT: CPT | Performed by: EMERGENCY MEDICINE

## 2023-12-11 PROCEDURE — 85025 COMPLETE CBC W/AUTO DIFF WBC: CPT | Performed by: STUDENT IN AN ORGANIZED HEALTH CARE EDUCATION/TRAINING PROGRAM

## 2023-12-11 PROCEDURE — 36415 COLL VENOUS BLD VENIPUNCTURE: CPT | Performed by: STUDENT IN AN ORGANIZED HEALTH CARE EDUCATION/TRAINING PROGRAM

## 2023-12-11 RX ORDER — IOPAMIDOL 755 MG/ML
57 INJECTION, SOLUTION INTRAVASCULAR ONCE
Status: COMPLETED | OUTPATIENT
Start: 2023-12-11 | End: 2023-12-11

## 2023-12-11 RX ADMIN — SODIUM CHLORIDE 84 ML: 9 INJECTION, SOLUTION INTRAVENOUS at 21:56

## 2023-12-11 RX ADMIN — IOPAMIDOL 57 ML: 755 INJECTION, SOLUTION INTRAVENOUS at 21:56

## 2023-12-11 ASSESSMENT — ACTIVITIES OF DAILY LIVING (ADL)
ADLS_ACUITY_SCORE: 35
ADLS_ACUITY_SCORE: 35

## 2023-12-12 VITALS
TEMPERATURE: 97.7 F | HEART RATE: 62 BPM | BODY MASS INDEX: 22.2 KG/M2 | OXYGEN SATURATION: 97 % | WEIGHT: 130 LBS | SYSTOLIC BLOOD PRESSURE: 126 MMHG | RESPIRATION RATE: 16 BRPM | HEIGHT: 64 IN | DIASTOLIC BLOOD PRESSURE: 77 MMHG

## 2023-12-12 PROCEDURE — 258N000003 HC RX IP 258 OP 636: Performed by: STUDENT IN AN ORGANIZED HEALTH CARE EDUCATION/TRAINING PROGRAM

## 2023-12-12 RX ORDER — AZITHROMYCIN 250 MG/1
TABLET, FILM COATED ORAL
Qty: 6 TABLET | Refills: 0 | Status: SHIPPED | OUTPATIENT
Start: 2023-12-12 | End: 2023-12-17

## 2023-12-12 RX ADMIN — SODIUM CHLORIDE 1000 ML: 9 INJECTION, SOLUTION INTRAVENOUS at 01:16

## 2023-12-12 ASSESSMENT — ACTIVITIES OF DAILY LIVING (ADL): ADLS_ACUITY_SCORE: 35

## 2023-12-12 NOTE — ED TRIAGE NOTES
Pt reports cold x6 weeks. Today Pt reports heaviness in chest. Pt seen at  where they did blood work, EKG, and chest XR. Pt reports elevated Ddimer at .87 and sent by  for concern of blood clot.

## 2023-12-12 NOTE — DISCHARGE INSTRUCTIONS
Please continue with ibuprofen every 6 hours or naproxen every 12 hours as needed for congestion.  Your evaluation in the ER is very reassuring.

## 2023-12-12 NOTE — ED PROVIDER NOTES
"  History     Chief Complaint:  Abnormal Labs, Chest Pain, and Shortness of Breath    The history is provided by the patient.     Leyda Scruggs is a 63 year old female with history of rheumatoid arthritis presenting for evaluation of abnormal labs and chest pain after being recommended to the ED by Urgent Care for an elevated D-dimer. Leyda notes a cough and cold for the last six weeks, adding that she developed chest heaviness over the past few days. She reports currently feeling improved. She denies fevers. She denies use of Tylenol or ibuprofen. She endorses adequate hydration. She reports a history of rheumatoid arthritis for which she takes methotrexate and hydroxychloroquine.    Independent Historian:   None - Patient Only      Medications:    Calcitrate  Plaquenil  Methotrexate  Deltasone  Aldactone  Celexa  Lyrica  Zanaflex    Past Medical History:    Arthritis  Autoimmune disease  BPPV  Meniere's disease  Migraines  Osteoarthritis  Recurrent otitis media  Rheumatoid arthritis flare  Thyroid disease  Tinnitus  Grave's disease  Inflammatory dermatosis  Polyp of colon    Past Surgical History:    Elbow surgery, left  [place] teeth extraction    Physical Exam   Patient Vitals for the past 24 hrs:   BP Temp Temp src Pulse Resp SpO2 Height Weight   12/12/23 0115 126/77 97.7  F (36.5  C) Oral 62 -- 97 % -- --   12/11/23 2018 122/73 97.4  F (36.3  C) Temporal 73 16 97 % 1.626 m (5' 4\") 59 kg (130 lb)     Physical Exam  General: Appears well-developed and well-nourished.   Head: No signs of trauma.   Mouth/Throat: Oropharynx is clear and moist.   Neck: Normal range of motion. No nuchal rigidity. No cervical adenopathy  CV: Normal rate and regular rhythm.    Resp: Effort normal and breath sounds normal. No respiratory distress.   GI: Soft. There is no tenderness.  No rebound or guarding.  Normal bowel sounds.  No CVA tenderness.  MSK: Normal range of motion. no edema. No Calf tenderness.  Neuro: The patient is alert and " oriented. Speech normal.  Skin: Skin is warm and dry. No rash noted.   Psych: normal mood and affect. behavior is normal.       Emergency Department Course   ECG  ECG taken at 2030, ECG read at 2035  Normal sinus rhythm  Normal ECG   Rate 66 bpm. FL interval 158 ms. QRS duration 88 ms. QT/QTc 426/446 ms. P-R-T axes 70 59 52.     Imaging:  CT Chest Pulmonary Embolism w Contrast   Final Result   IMPRESSION:   1.  Negative for pulmonary embolism.      2.  No evidence of pneumonia or pulmonary edema.        Laboratory:  Labs Ordered and Resulted from Time of ED Arrival to Time of ED Departure   BASIC METABOLIC PANEL - Abnormal       Result Value    Sodium 141      Potassium 4.1      Chloride 102      Carbon Dioxide (CO2) 29      Anion Gap 10      Urea Nitrogen 16.5      Creatinine 1.12 (*)     GFR Estimate 55 (*)     Calcium 9.2      Glucose 119 (*)    TROPONIN T, HIGH SENSITIVITY - Normal    Troponin T, High Sensitivity 7     INFLUENZA A/B, RSV, & SARS-COV2 PCR - Normal    Influenza A PCR Negative      Influenza B PCR Negative      RSV PCR Negative      SARS CoV2 PCR Negative     CBC WITH PLATELETS AND DIFFERENTIAL    WBC Count 4.5      RBC Count 4.00      Hemoglobin 12.2      Hematocrit 37.5      MCV 94      MCH 30.5      MCHC 32.5      RDW 15.0      Platelet Count 238      % Neutrophils 50      % Lymphocytes 37      % Monocytes 10      % Eosinophils 2      % Basophils 1      % Immature Granulocytes 0      NRBCs per 100 WBC 0      Absolute Neutrophils 2.3      Absolute Lymphocytes 1.7      Absolute Monocytes 0.4      Absolute Eosinophils 0.1      Absolute Basophils 0.0      Absolute Immature Granulocytes 0.0      Absolute NRBCs 0.0       Emergency Department Course & Assessments:       Interventions:  Medications   sodium chloride 0.9% BOLUS 1,000 mL (0 mLs Intravenous Stopped 12/12/23 0136)   iopamidol (ISOVUE-370) solution 57 mL (57 mLs Intravenous $Given 12/11/23 8576)   Saline Flush (84 mLs Intravenous $Given  12/11/23 2156)     Assessments:  0123 I obtained history and examined the patient as noted above. I discussed findings and discharge with the patient. All questions answered.     Independent Interpretation (X-rays, CTs, rhythm strip):  On  my independent interpretation of CT PE, no pneumothorax noted    Consultations/Discussion of Management or Tests:  None        Social Determinants of Health affecting care:   None    Disposition:  The patient was discharged to home.     Impression & Plan    Medical Decision Making:  Leyda Scruggs presents due to an elevated D-dimer.  She states that she has had a cough and congestion over the last number of weeks.  She had gone to the urgent care who did a chest x-ray and blood work including a D-dimer.  The D-dimer came back elevated, so she was recommended to come to the emergency department.  On evaluation she did not appear in distress.  She had appropriate vital signs.  Given the elevated D-dimer, CT scan was obtained and this did not show signs of a PE or other acute process and repeat blood work was reassuring.  Patient has had congestion, and I discussed that her symptoms may be related to postnasal drip.  I discussed allergies to help address this to help with her symptoms.  Given she is on methotrexate and the length of her symptoms, I also felt it was reasonable to start her on azithromycin.  She was recommended follow-up in clinic and return to the ER for any further concerns.    Diagnosis:    ICD-10-CM    1. Upper respiratory tract infection, unspecified type  J06.9       2. Elevated d-dimer  R79.89         Discharge Medications:  New Prescriptions    AZITHROMYCIN (ZITHROMAX) 250 MG TABLET    Take 2 tablets (500 mg) by mouth daily for 1 day, THEN 1 tablet (250 mg) daily for 4 days.     Scribe Disclosure:  Justin MAR, am serving as a scribe at 1:24 AM on 12/12/2023 to document services personally performed by Kervin Ramirez MD based on my observations  and the provider's statements to me.   12/12/2023   Kervin Ramirez MD Bergenstal, John A, MD  12/12/23 0707